# Patient Record
Sex: FEMALE | Race: WHITE | NOT HISPANIC OR LATINO | Employment: UNEMPLOYED | URBAN - METROPOLITAN AREA
[De-identification: names, ages, dates, MRNs, and addresses within clinical notes are randomized per-mention and may not be internally consistent; named-entity substitution may affect disease eponyms.]

---

## 2017-09-12 ENCOUNTER — OFFICE VISIT (OUTPATIENT)
Dept: OBSTETRICS AND GYNECOLOGY | Facility: CLINIC | Age: 42
End: 2017-09-12

## 2017-09-12 VITALS
SYSTOLIC BLOOD PRESSURE: 121 MMHG | WEIGHT: 131 LBS | HEART RATE: 92 BPM | BODY MASS INDEX: 24.11 KG/M2 | DIASTOLIC BLOOD PRESSURE: 83 MMHG | HEIGHT: 62 IN

## 2017-09-12 DIAGNOSIS — Z85.3 PERSONAL HISTORY OF BREAST CANCER: ICD-10-CM

## 2017-09-12 DIAGNOSIS — Z01.419 VISIT FOR GYNECOLOGIC EXAMINATION: Primary | ICD-10-CM

## 2017-09-12 PROCEDURE — G0101 CA SCREEN;PELVIC/BREAST EXAM: HCPCS | Performed by: OBSTETRICS & GYNECOLOGY

## 2017-09-12 RX ORDER — PANTOPRAZOLE SODIUM 40 MG/1
GRANULE, DELAYED RELEASE ORAL
COMMUNITY

## 2017-09-12 RX ORDER — CLONAZEPAM 0.5 MG/1
0.5 TABLET ORAL 2 TIMES DAILY PRN
COMMUNITY

## 2017-09-12 RX ORDER — DEXAMETHASONE 4 MG/1
4 TABLET ORAL 2 TIMES DAILY WITH MEALS
COMMUNITY

## 2017-09-12 RX ORDER — TAMOXIFEN CITRATE 20 MG/1
TABLET ORAL DAILY
COMMUNITY

## 2017-09-12 RX ORDER — HYDROCODONE BITARTRATE AND ACETAMINOPHEN 5; 325 MG/1; MG/1
1 TABLET ORAL EVERY 6 HOURS PRN
COMMUNITY

## 2017-09-12 NOTE — PROGRESS NOTES
"[facility]  [address], [address]  [place], [place] [address]  Phone: [phone] / Fax:  [phone]      2017    Marciano DARDEN [facility] [address]    ARSLAN Mustafa    Chief Complaint   Patient presents with   • Gynecologic Exam     Annual Exam        Cash is here for annual gynecologic exam.  HPI - No cycles.  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  Currently receiving chemotherapy and Tamoxifen.  Received radiation.      Past Medical History:   Diagnosis Date   • Abnormal Pap smear of cervix    • Anemia    • Anxiety    • Breast cancer    • Constipation        Past Surgical History:   Procedure Laterality Date   • BREAST IMPLANT SURGERY      after mastectomy   • EYE SURGERY     • MASTECTOMY     • TUBAL ABDOMINAL LIGATION         Allergies   Allergen Reactions   • Penicillins        Social History     Social History   • Marital status:      Spouse name: N/A   • Number of children: N/A   • Years of education: N/A     Occupational History   • Not on file.     Social History Main Topics   • Smoking status: Never Smoker   • Smokeless tobacco: Not on file   • Alcohol use No   • Drug use: No   • Sexual activity: Not Currently     Birth control/ protection: Surgical     Other Topics Concern   • Not on file     Social History Narrative       Family History   Problem Relation Age of Onset   • Colon cancer Mother    • Colon polyps Mother    • Seizures Mother      epilepsy   • Hypertension Father    • Kidney disease Father    • Cancer Other    • Diabetes Other    • Heart disease Other        No LMP recorded. Patient is not currently having periods (Reason: Chemotherapy/radiation).    OB History      Para Term  AB TAB SAB Ectopic Multiple Living    3 3 3       3          Vitals:    17 1135   BP: 121/83   Pulse: 92   Weight: 131 lb (59.4 kg)   Height: 62\" (157.5 cm)       Physical Exam   Constitutional: She " appears well-developed and well-nourished.   Genitourinary: Vagina normal and uterus normal. Pelvic exam was performed with patient supine. There is no tenderness or lesion on the right labia. There is no tenderness or lesion on the left labia. Right adnexum does not display tenderness and does not display fullness. Left adnexum does not display tenderness and does not display fullness. Cervix does not exhibit motion tenderness or lesion.   HENT:   Nose: Nose normal.   Eyes: Conjunctivae are normal.   Neck: Normal range of motion. Neck supple. No thyromegaly present.   Cardiovascular: Normal rate, regular rhythm and normal heart sounds.    Pulmonary/Chest: Effort normal and breath sounds normal. She has no wheezes. Right breast exhibits no mass and no nipple discharge. Left breast exhibits no mass and no nipple discharge.   Abdominal: Soft. There is no tenderness. There is no rebound and no guarding.   Musculoskeletal: Normal range of motion. She exhibits no edema.   Neurological: She is alert. Coordination normal.   Skin: Skin is warm and dry.   Thinning scalp hair   Psychiatric: She has a normal mood and affect. Her behavior is normal. Judgment and thought content normal.   Vitals reviewed.      April was seen today for gynecologic exam.    Diagnoses and all orders for this visit:    Visit for gynecologic examination  -  Discussed importance of regular screening and health maintenance.  Personal history of breast cancer  - Patient to continue on Tamoxifen.  If bleeding occurs, she is aware that she will need seen.      Flip Torres MD

## 2018-01-10 ENCOUNTER — OFFICE VISIT (OUTPATIENT)
Dept: NEUROSURGERY | Facility: CLINIC | Age: 43
End: 2018-01-10

## 2018-01-10 VITALS
HEART RATE: 78 BPM | BODY MASS INDEX: 24.11 KG/M2 | HEIGHT: 62 IN | DIASTOLIC BLOOD PRESSURE: 78 MMHG | WEIGHT: 131 LBS | SYSTOLIC BLOOD PRESSURE: 121 MMHG

## 2018-01-10 DIAGNOSIS — C79.31 BRAIN METASTASIS: ICD-10-CM

## 2018-01-10 DIAGNOSIS — Q07.9: Primary | ICD-10-CM

## 2018-01-10 PROCEDURE — 99213 OFFICE O/P EST LOW 20 MIN: CPT | Performed by: NEUROLOGICAL SURGERY

## 2018-01-10 NOTE — PROGRESS NOTES
Subjective   Patient ID: Lindsay Castrejon is a 42 y.o. female is here today for follow-up on headaches and memory loss.    At the patient's last visit she reported a change in her headaches. She reported that the pain was only on the right side of her head.    Today the patient reports that she was diagnosed with stage 4 cancer 4/2017. She has had one radiation treatment and she is going through chemotherapy.    Neurologic Problem   The patient's primary symptoms include focal sensory loss and memory loss. The patient's pertinent negatives include no altered mental status, clumsiness, focal weakness, near-syncope, slurred speech, syncope or weakness. This is a chronic problem. The current episode started more than 1 month ago. The neurological problem developed insidiously. There was left-sided and lower extremity focality noted. Associated symptoms include back pain, fatigue and headaches. Pertinent negatives include no abdominal pain, auditory change, aura, bladder incontinence, bowel incontinence, chest pain, confusion, diaphoresis, dizziness, fever, light-headedness, nausea, neck pain, palpitations, shortness of breath, vertigo or vomiting. Past treatments include acetaminophen, medication and sleep. The treatment provided significant relief.       The following portions of the patient's history were reviewed and updated as appropriate: allergies, current medications, past family history, past medical history, past social history, past surgical history and problem list.    Review of Systems   Constitutional: Positive for fatigue. Negative for diaphoresis and fever.   Respiratory: Negative for shortness of breath.    Cardiovascular: Negative for chest pain, palpitations and near-syncope.   Gastrointestinal: Negative for abdominal pain, bowel incontinence, nausea and vomiting.   Genitourinary: Negative for bladder incontinence.   Musculoskeletal: Positive for back pain. Negative for neck pain.   Neurological:  Positive for headaches. Negative for dizziness, vertigo, focal weakness, syncope, weakness and light-headedness.   Psychiatric/Behavioral: Positive for memory loss. Negative for confusion.   All other systems reviewed and are negative.    I last saw this patient almost 2 years ago.  She has a history of breast cancer and I was following her for a left cerebellar lesion that really did not look like a metastasis.  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  She underwent single fraction stereotactic radiosurgery by Dr. Del Castillo and it worked initially, but followup study showed that the mass was growing again and she has been seen by Dr. Morse who has recommended surgery.  She came her for my opinion about this and I think that the recommendation is entirely reasonable and I urged her to follow up with Dr. Morse to have it done.  He can follow her for this incidentally discovered cerebellar lesion, so we can keep it open-ended from here.        Objective   Physical Exam   Constitutional: She is oriented to person, place, and time. She appears well-developed and well-nourished.   HENT:   Head: Normocephalic and atraumatic.   Eyes: Conjunctivae and EOM are normal. Pupils are equal, round, and reactive to light.   Fundoscopic exam:       The right eye shows no papilledema. The right eye shows venous pulsations.        The left eye shows no papilledema. The left eye shows venous pulsations.   Neck: Carotid bruit is not present.   Neurological: She is oriented to person, place, and time. She has a normal Finger-Nose-Finger Test and a normal Heel to Shin Test. Gait normal.   Reflex Scores:       Tricep reflexes are 2+ on the right side and 2+ on the left side.       Bicep reflexes are 2+ on the right side and 2+ on the left side.        Brachioradialis reflexes are 2+ on the right side and 2+ on the left side.       Patellar reflexes are 2+ on the right side and 2+ on the left side.       Achilles reflexes are 2+ on the right side and 2+ on the left side.  Psychiatric: Her speech is normal.     Neurologic Exam     Mental Status   Oriented to person, place, and time.   Registration of memory: Good recent and remote memory.   Attention: normal. Concentration: normal.   Speech: speech is normal   Level of consciousness: alert  Knowledge: consistent with education.     Cranial Nerves     CN II   Visual fields full to confrontation.   Visual acuity: normal    CN III, IV, VI   Pupils are equal, round, and reactive to light.  Extraocular motions are normal.     CN V   Facial sensation intact.   Right corneal reflex: normal  Left corneal reflex: normal    CN VII   Facial expression full, symmetric.   Right facial weakness: none  Left facial weakness: none    CN VIII   Hearing: intact    CN IX, X   Palate: symmetric    CN XI   Right sternocleidomastoid strength: normal  Left sternocleidomastoid strength: normal    CN XII   Tongue: not atrophic  Tongue deviation: none    Motor Exam   Muscle bulk: normal  Right arm tone: normal  Left arm tone: normal  Right leg tone: normal  Left leg tone: normal    Strength   Strength 5/5 except as noted.     Sensory Exam   Light touch normal.     Gait, Coordination, and Reflexes     Gait  Gait: normal    Coordination   Finger to nose coordination: normal  Heel to shin coordination: normal    Reflexes   Right brachioradialis: 2+  Left brachioradialis: 2+  Right biceps: 2+  Left biceps: 2+  Right triceps: 2+  Left triceps: 2+  Right patellar: 2+  Left patellar: 2+  Right achilles: 2+  Left achilles: 2+  Right : 2+  Left : 2+      Assessment/Plan   Independent Review of Radiographic Studies:    I have reviewed a series of MRIs done at [facility] in 2017.  The left cerebellar lesion is unchanged, but she developed  a left frontal metastasis that initially responded to stereotactic radiosurgery, but now has started increasing in size and in enhancement and edema.   It is about 8 mm.       Medical Decision Making:    I told her and her  that I agreed with Dr. Morse's assessment and encouraged them to follow up with him and get her surgery done.  He can certainly follow her for this asymptomatic cerebellar lesion in the future so we will keep it open-ended at this point.        April was seen today for headache.    Diagnoses and all orders for this visit:    Anomaly of brain, spinal cord, and nervous system    Brain metastasis    Return if symptoms worsen or fail to improve.

## 2021-04-02 ENCOUNTER — BULK ORDERING (OUTPATIENT)
Dept: CASE MANAGEMENT | Facility: OTHER | Age: 46
End: 2021-04-02

## 2021-04-02 DIAGNOSIS — Z23 IMMUNIZATION DUE: ICD-10-CM
